# Patient Record
Sex: FEMALE | Race: WHITE | NOT HISPANIC OR LATINO | Employment: FULL TIME | URBAN - NONMETROPOLITAN AREA
[De-identification: names, ages, dates, MRNs, and addresses within clinical notes are randomized per-mention and may not be internally consistent; named-entity substitution may affect disease eponyms.]

---

## 2017-12-15 ENCOUNTER — COMMUNICATION - GICH (OUTPATIENT)
Dept: FAMILY MEDICINE | Facility: OTHER | Age: 22
End: 2017-12-15

## 2017-12-15 DIAGNOSIS — Z30.41 ENCOUNTER FOR SURVEILLANCE OF CONTRACEPTIVE PILLS: ICD-10-CM

## 2018-01-02 ENCOUNTER — HISTORY (OUTPATIENT)
Dept: FAMILY MEDICINE | Facility: OTHER | Age: 23
End: 2018-01-02

## 2018-01-02 ENCOUNTER — OFFICE VISIT - GICH (OUTPATIENT)
Dept: FAMILY MEDICINE | Facility: OTHER | Age: 23
End: 2018-01-02

## 2018-01-02 DIAGNOSIS — H10.9 CONJUNCTIVITIS: ICD-10-CM

## 2018-02-02 ENCOUNTER — DOCUMENTATION ONLY (OUTPATIENT)
Dept: FAMILY MEDICINE | Facility: OTHER | Age: 23
End: 2018-02-02

## 2018-02-02 RX ORDER — DESOGESTREL AND ETHINYL ESTRADIOL 0.15-0.03
1 KIT ORAL DAILY
COMMUNITY
Start: 2017-12-15 | End: 2018-03-12

## 2018-02-09 VITALS
HEART RATE: 94 BPM | DIASTOLIC BLOOD PRESSURE: 80 MMHG | HEIGHT: 66 IN | TEMPERATURE: 97.6 F | WEIGHT: 209.4 LBS | BODY MASS INDEX: 33.65 KG/M2 | SYSTOLIC BLOOD PRESSURE: 130 MMHG

## 2018-02-12 NOTE — NURSING NOTE
Patient Information     Patient Name MRN Kusum Moss [id number] Female 1995      Nursing Note by Callie Centeno at 2018  1:15 PM     Author:  Callie Centeno Service:  (none) Author Type:  NURS- Student Practical Nurse     Filed:  2018  1:50 PM Encounter Date:  2018 Status:  Signed     :  Callie Centeno (NURS- Student Practical Nurse)            Patient presents with bilateral redness of eyes, mattering of eyes starting yesterday. Exposed to a bacterial infection in nieces eyes. Callie Centeno LPN .............2018  1:37 PM

## 2018-02-12 NOTE — PATIENT INSTRUCTIONS
Patient Information     Patient Name MRN Kusum Moss [id number] Female 1995      Patient Instructions by Marilee Bautista NP at 2018  1:15 PM     Author:  Marilee Bautista NP Service:  (none) Author Type:  PHYS- Nurse Practitioner     Filed:  2018  1:56 PM Encounter Date:  2018 Status:  Signed     :  Marilee Bautista NP (PHYS- Nurse Practitioner)               Index Canadian Related topics   Viral or Bacterial Conjunctivitis (Pinkeye)   ________________________________________________________________________  KEY POINTS    Viral or bacterial conjunctivitis is infection, redness, and swelling of the clear membrane that lines the inside of your eyelids and covers the white of your eye.    Viral conjunctivitis will usually go away on its own without treatment. However, your healthcare provider may prescribe eye drops to help control your symptoms.    For bacterial conjunctivitis, your healthcare provider will prescribe antibiotic eye drops.    To keep from getting conjunctivitis from someone who has it, or to keep from spreading it to others, wash your hands often, and avoid close contact with people until your symptoms improve.  ________________________________________________________________________  What is conjunctivitis?   Conjunctivitis is redness and swelling of the clear membrane that lines the inside of your eyelids and covers the white of your eye. This membrane is called the conjunctiva. This redness and swelling is also called pinkeye.   What is the cause?   This type of conjunctivitis is an infection caused by viruses or bacteria. These germs can be spread easily by coughing or sneezing, or by touching something with your hands and then touching your eyes. It can also be caused by improper cleaning of soft contact lenses.  What are the symptoms?   Symptoms may include:    Red, itchy, swollen or scratchy eyes    Sensitivity to  light    Pus or watery discharge, which can cause crusty eyelashes  How is it diagnosed?   Your healthcare provider will ask about your symptoms, medical history, and activities, and examine your eyes. He or she will also check for enlarged lymph nodes near your ear and jaw, which may be a sign of an infection in your body. A sample of the pus from your eye may be sent to a lab to check for the cause.  How is it treated?   Viral conjunctivitis will usually go away on its own without treatment. However, your healthcare provider may prescribe eye drops to help control your symptoms. Anti-allergy pills or eye drops may also relieve the itching and redness. Viral conjunctivitis usually gets worse at first, then gets better in 3 to 10 days. If only one eye is affected at first, it may spread to the other eye later. Usually, if both eyes are affected, the first eye has worse conjunctivitis than the second.  For bacterial conjunctivitis, your healthcare provider will prescribe antibiotic eye drops. With antibiotics, the symptoms should improve in a couple of days. It is important to avoid close contact with people until you have used the antibiotics for 24 hours and your eye does not have a lot of pus.   If you wear contact lenses, your provider may tell you to stop wearing them until your infection is gone. Wearing contact lenses while you have conjunctivitis may make the infection last longer. Wearing contacts while you have conjunctivitis may also damage your cornea, which is the clear outer layer on the front of your eye, and cause severe and sometimes permanent vision problems. Your provider may ask you to throw away your current contact lenses and lens case.  How can I take care of myself?   Follow the full course of treatment your healthcare provider prescribes. Ask your healthcare provider:    How and when you will get your test results    How long it will take to recover    If there are activities you should avoid  and when you can return to your normal activities    How to take care of yourself at home    What symptoms or problems you should watch for and what to do if you have them  Make sure you know when you should come back for a checkup. Keep all appointments for provider visits or tests.  How can I help prevent conjunctivitis?   To keep from getting conjunctivitis from someone who has it, or to keep from spreading it to others, follow these guidelines:    Wash your hands often. Do not touch or rub your eyes.    Do not share eye makeup or cosmetics with anyone. When you have conjunctivitis, throw out all eye makeup you have been using.    Never use eye medicine that has been prescribed for someone else.    Do not share towels, washcloths, pillows, or sheets with anyone. If one of your eyes is affected but not the other, use a separate towel for each eye.    Avoid swimming while you have conjunctivitis.    Avoid close contact with people until your symptoms improve. Depending on your job, you may be asked to take time off from work until the conjunctivitis is healed.  Reviewed for medical accuracy by faculty at the [facility]. Web site: http://[email or web address]/  Developed by [employer].  Adult Advisor 2017.2 published by [employer].  Last modified: 2016-11-01  Last reviewed: 2016-10-31  This content is reviewed periodically and is subject to change as new health information becomes available. The information is intended to inform and educate and is not a replacement for medical evaluation, advice, diagnosis or treatment by a healthcare professional.  References   Adult Advisor 2017.2 Index    Copyright   2017 [employer], a division of [employer] All rights reserved.

## 2018-02-12 NOTE — PROGRESS NOTES
"Patient Information     Patient Name MRN Sex Kusum Rios [id number] Female 1995      Progress Notes by Marilee Bautista NP at 2018  1:15 PM     Author:  Marilee Bautista NP Service:  (none) Author Type:  PHYS- Nurse Practitioner     Filed:  1/3/2018  8:12 PM Encounter Date:  2018 Status:  Signed     :  aMrilee Bautista NP (PHYS- Nurse Practitioner)            HPI:  Nursing Notes:   Callie Centeno  2018  1:50 PM  Signed  Patient presents with bilateral redness of eyes, mattering of eyes starting yesterday. Exposed to a bacterial infection in nieces eyes. Callie Centeno LPN .............2018  1:37 PM      Kusum Du is a 22 y.o. female who presents to clinic today for eyes are red, with copious amounts of boogers coming out of them. Recently exposed to bacterial pink eye by niece. Denies pain or itching. Keeping eyes clean with warm wash cloth.     Past Medical History:     Diagnosis  Date     Hypertrophy of tonsil and adenoid      Past Surgical History:      Procedure  Laterality Date     MYRINGOTOMY W TUBE PLACEMENT      Recurrent ear infections       Social History     Substance Use Topics       Smoking status: Never Smoker     Smokeless tobacco: Never Used     Alcohol use No     Current Outpatient Prescriptions       Medication  Sig Dispense Refill     ENSKYCE tablet TAKE 1 TABLET BY MOUTH EVERY DAY 84 tablet 0     No current facility-administered medications for this visit.      Medications have been reviewed by me and are current to the best of my knowledge and ability.    No Known Allergies    ROS:  Refer to HPI    /80 (Cuff Site: Left Arm, Position: Sitting, Cuff Size: Adult Regular)  Pulse 94  Temp 97.6  F (36.4  C) (Tympanic)   Ht 1.68 m (5' 6.14\")  Wt 95 kg (209 lb 6.4 oz)  Breastfeeding? No  BMI 33.65 kg/m2    EXAM:  General Appearance: Well appearing female appropriate appearance for age. No acute " distress  Eyes: conjunctivae erythematous, small amount of white discharge, PERRLA  Psychological: normal affect, alert and pleasant    ASSESSMENT/PLAN:    ICD-10-CM    1. Bacterial conjunctivitis of both eyes H10.9 trimethoprim-polymyxin b (POLYTRIM) ophthalmic solution   Eyes are red with lots of white discharge, recent exposure to bacterial conjunctivitis  On exam: eyes are red with small amount of white discharge, PERRLA  Diagnosis: Bacterial Conjunctivitis  Treat with Polytrim 1 drop each eye qid 7 days  Good hand washing  Follow up if symptoms persist or worsen    Patient Instructions      Index Slovak Related topics   Viral or Bacterial Conjunctivitis (Pinkeye)   ________________________________________________________________________  KEY POINTS    Viral or bacterial conjunctivitis is infection, redness, and swelling of the clear membrane that lines the inside of your eyelids and covers the white of your eye.    Viral conjunctivitis will usually go away on its own without treatment. However, your healthcare provider may prescribe eye drops to help control your symptoms.    For bacterial conjunctivitis, your healthcare provider will prescribe antibiotic eye drops.    To keep from getting conjunctivitis from someone who has it, or to keep from spreading it to others, wash your hands often, and avoid close contact with people until your symptoms improve.  ________________________________________________________________________  What is conjunctivitis?   Conjunctivitis is redness and swelling of the clear membrane that lines the inside of your eyelids and covers the white of your eye. This membrane is called the conjunctiva. This redness and swelling is also called pinkeye.   What is the cause?   This type of conjunctivitis is an infection caused by viruses or bacteria. These germs can be spread easily by coughing or sneezing, or by touching something with your hands and then touching your eyes. It can also be  caused by improper cleaning of soft contact lenses.  What are the symptoms?   Symptoms may include:    Red, itchy, swollen or scratchy eyes    Sensitivity to light    Pus or watery discharge, which can cause crusty eyelashes  How is it diagnosed?   Your healthcare provider will ask about your symptoms, medical history, and activities, and examine your eyes. He or she will also check for enlarged lymph nodes near your ear and jaw, which may be a sign of an infection in your body. A sample of the pus from your eye may be sent to a lab to check for the cause.  How is it treated?   Viral conjunctivitis will usually go away on its own without treatment. However, your healthcare provider may prescribe eye drops to help control your symptoms. Anti-allergy pills or eye drops may also relieve the itching and redness. Viral conjunctivitis usually gets worse at first, then gets better in 3 to 10 days. If only one eye is affected at first, it may spread to the other eye later. Usually, if both eyes are affected, the first eye has worse conjunctivitis than the second.  For bacterial conjunctivitis, your healthcare provider will prescribe antibiotic eye drops. With antibiotics, the symptoms should improve in a couple of days. It is important to avoid close contact with people until you have used the antibiotics for 24 hours and your eye does not have a lot of pus.   If you wear contact lenses, your provider may tell you to stop wearing them until your infection is gone. Wearing contact lenses while you have conjunctivitis may make the infection last longer. Wearing contacts while you have conjunctivitis may also damage your cornea, which is the clear outer layer on the front of your eye, and cause severe and sometimes permanent vision problems. Your provider may ask you to throw away your current contact lenses and lens case.  How can I take care of myself?   Follow the full course of treatment your healthcare provider  prescribes. Ask your healthcare provider:    How and when you will get your test results    How long it will take to recover    If there are activities you should avoid and when you can return to your normal activities    How to take care of yourself at home    What symptoms or problems you should watch for and what to do if you have them  Make sure you know when you should come back for a checkup. Keep all appointments for provider visits or tests.  How can I help prevent conjunctivitis?   To keep from getting conjunctivitis from someone who has it, or to keep from spreading it to others, follow these guidelines:    Wash your hands often. Do not touch or rub your eyes.    Do not share eye makeup or cosmetics with anyone. When you have conjunctivitis, throw out all eye makeup you have been using.    Never use eye medicine that has been prescribed for someone else.    Do not share towels, washcloths, pillows, or sheets with anyone. If one of your eyes is affected but not the other, use a separate towel for each eye.    Avoid swimming while you have conjunctivitis.    Avoid close contact with people until your symptoms improve. Depending on your job, you may be asked to take time off from work until the conjunctivitis is healed.  Reviewed for medical accuracy by faculty at the [facility]. Web site: http://[email or web address]/scott/  Developed by [employer].  Adult Advisor 2017.2 published by [employer].  Last modified: 2016-11-01  Last reviewed: 2016-10-31  This content is reviewed periodically and is subject to change as new health information becomes available. The information is intended to inform and educate and is not a replacement for medical evaluation, advice, diagnosis or treatment by a healthcare professional.  References   Adult Advisor 2017.2 Index    Copyright   2017 [employer], a division of [employer] All rights reserved.

## 2018-02-12 NOTE — TELEPHONE ENCOUNTER
Patient Information     Patient Name MRN Sex Kusum Rios [id number] Female 1995      Telephone Encounter by Letitia Mckeon RN at 12/15/2017  3:14 PM     Author:  Letitia Mckeon RN Service:  (none) Author Type:  NURS- Registered Nurse     Filed:  12/15/2017  3:16 PM Encounter Date:  12/15/2017 Status:  Signed     :  Letitia Mckeon RN (NURS- Registered Nurse)            Hormones  Office visit in the past 12 months or per provider note.  Last visit with MCKAYLA HODGE was on: 12/15/2016 in [facility]  Next visit with MCKAYLA HODGE is on: No future appointment listed with this provider  Next visit with Family Practice is on: No future appointment listed in this department  Max refill for 12 months from last office visit or per provider note.  Due for exam.  Limited refill per protocol and letter mailed.  Letitia Mckeon RN ........   12/15/2017    3:15 PM

## 2018-03-12 DIAGNOSIS — Z30.41 ENCOUNTER FOR SURVEILLANCE OF CONTRACEPTIVE PILLS: Primary | ICD-10-CM

## 2018-03-14 RX ORDER — DESOGESTREL AND ETHINYL ESTRADIOL 0.15-0.03
KIT ORAL
Qty: 84 TABLET | Refills: 0 | Status: SHIPPED | OUTPATIENT
Start: 2018-03-14 | End: 2018-06-23

## 2018-03-14 NOTE — TELEPHONE ENCOUNTER
ENSKYCE tablet  Last Office Visit: 12/15/2016  Future Office visit:       Routing refill request to provider for review/approval because: Patient needs to be seen because it has been more than 1 year since last office visit. Reminder letter  sent with last refill. Message left on voice mail today recommending to call back and schedule an OV.     Unable to complete prescription refill per RNMedication Refill Policy.................... Letitia Mckeon ....................  3/14/2018   11:11 AM

## 2018-03-19 ENCOUNTER — HEALTH MAINTENANCE LETTER (OUTPATIENT)
Age: 23
End: 2018-03-19

## 2018-06-23 DIAGNOSIS — Z30.41 ENCOUNTER FOR SURVEILLANCE OF CONTRACEPTIVE PILLS: ICD-10-CM

## 2018-06-26 RX ORDER — DESOGESTREL AND ETHINYL ESTRADIOL 0.15-0.03
KIT ORAL
Qty: 84 TABLET | Refills: 3 | Status: SHIPPED | OUTPATIENT
Start: 2018-06-26

## 2018-06-26 NOTE — TELEPHONE ENCOUNTER
ENSKYCE 0.15-30 MG-MCG per tablet  LOV-12/15/2016    Future Office visit:       Routing refill request to provider for review/approval because:  Patient remains over due for OV after multiply notifications Reminder letter and messages left on voice mail.     Unable to complete prescription refill per RNMedication Refill Policy.................... Letitia Mckeon ....................  6/26/2018   9:27 AM

## 2018-07-23 NOTE — PROGRESS NOTES
Patient Information     Patient Name  Kusum Du MRN  [id number] Sex  Female   1995      Letter by Doris Reynolds DO at      Author:  Doris Reynolds DO Service:  (none) Author Type:  (none)    Filed:   Encounter Date:  12/15/2017 Status:  (Other)           Kusum Du  [address] [facility] [address]          December 15, 2017    Dear Ms. Du:    A 90 day refill of ENSKYCE tablet has been called into your pharmacy.    Additional refills require an annual office visit with Ang Morales MD.   Please call the clinic at [phone] to schedule your appointment.    If you should require additional refills before your scheduled appointment, please contact your pharmacy and we will refill your medication until that date.      Thank you,    The Refill Nurse  [facility]

## 2023-07-08 ENCOUNTER — HOSPITAL ENCOUNTER (EMERGENCY)
Facility: OTHER | Age: 28
Discharge: HOME | End: 2023-07-09
Attending: FAMILY MEDICINE | Admitting: FAMILY MEDICINE
Payer: COMMERCIAL

## 2023-07-08 DIAGNOSIS — T50.905A MEDICATION REACTION, INITIAL ENCOUNTER: ICD-10-CM

## 2023-07-08 LAB
ALBUMIN SERPL BCG-MCNC: 4.3 G/DL (ref 3.5–5.2)
ALP SERPL-CCNC: 111 U/L (ref 35–104)
ALT SERPL W P-5'-P-CCNC: 15 U/L (ref 0–50)
ANION GAP SERPL CALCULATED.3IONS-SCNC: 13 MMOL/L (ref 7–15)
AST SERPL W P-5'-P-CCNC: 22 U/L (ref 0–45)
BASOPHILS # BLD AUTO: 0.1 10E3/UL (ref 0–0.2)
BASOPHILS NFR BLD AUTO: 0 %
BILIRUB SERPL-MCNC: <0.2 MG/DL
BUN SERPL-MCNC: 12.9 MG/DL (ref 6–20)
CALCIUM SERPL-MCNC: 9.6 MG/DL (ref 8.6–10)
CHLORIDE SERPL-SCNC: 101 MMOL/L (ref 98–107)
CREAT SERPL-MCNC: 0.77 MG/DL (ref 0.51–0.95)
DEPRECATED HCO3 PLAS-SCNC: 23 MMOL/L (ref 22–29)
EOSINOPHIL # BLD AUTO: 0.1 10E3/UL (ref 0–0.7)
EOSINOPHIL NFR BLD AUTO: 0 %
ERYTHROCYTE [DISTWIDTH] IN BLOOD BY AUTOMATED COUNT: 12.6 % (ref 10–15)
ETHANOL SERPL-MCNC: <0.01 G/DL
GFR SERPL CREATININE-BSD FRML MDRD: >90 ML/MIN/1.73M2
GLUCOSE BLDC GLUCOMTR-MCNC: 143 MG/DL (ref 70–99)
GLUCOSE SERPL-MCNC: 154 MG/DL (ref 70–99)
HCT VFR BLD AUTO: 38.5 % (ref 35–47)
HGB BLD-MCNC: 13.3 G/DL (ref 11.7–15.7)
IMM GRANULOCYTES # BLD: 0.1 10E3/UL
IMM GRANULOCYTES NFR BLD: 0 %
LYMPHOCYTES # BLD AUTO: 2.5 10E3/UL (ref 0.8–5.3)
LYMPHOCYTES NFR BLD AUTO: 19 %
MCH RBC QN AUTO: 29.2 PG (ref 26.5–33)
MCHC RBC AUTO-ENTMCNC: 34.5 G/DL (ref 31.5–36.5)
MCV RBC AUTO: 84 FL (ref 78–100)
MONOCYTES # BLD AUTO: 0.9 10E3/UL (ref 0–1.3)
MONOCYTES NFR BLD AUTO: 7 %
NEUTROPHILS # BLD AUTO: 9.8 10E3/UL (ref 1.6–8.3)
NEUTROPHILS NFR BLD AUTO: 74 %
NRBC # BLD AUTO: 0 10E3/UL
NRBC BLD AUTO-RTO: 0 /100
PLATELET # BLD AUTO: 288 10E3/UL (ref 150–450)
POTASSIUM SERPL-SCNC: 4.2 MMOL/L (ref 3.4–5.3)
PROT SERPL-MCNC: 7.2 G/DL (ref 6.4–8.3)
RBC # BLD AUTO: 4.56 10E6/UL (ref 3.8–5.2)
SODIUM SERPL-SCNC: 137 MMOL/L (ref 136–145)
WBC # BLD AUTO: 13.4 10E3/UL (ref 4–11)

## 2023-07-08 PROCEDURE — 82077 ASSAY SPEC XCP UR&BREATH IA: CPT | Performed by: FAMILY MEDICINE

## 2023-07-08 PROCEDURE — 85025 COMPLETE CBC W/AUTO DIFF WBC: CPT | Performed by: FAMILY MEDICINE

## 2023-07-08 PROCEDURE — 82962 GLUCOSE BLOOD TEST: CPT

## 2023-07-08 PROCEDURE — 96374 THER/PROPH/DIAG INJ IV PUSH: CPT | Performed by: FAMILY MEDICINE

## 2023-07-08 PROCEDURE — 250N000011 HC RX IP 250 OP 636: Performed by: FAMILY MEDICINE

## 2023-07-08 PROCEDURE — 96361 HYDRATE IV INFUSION ADD-ON: CPT | Performed by: FAMILY MEDICINE

## 2023-07-08 PROCEDURE — 99283 EMERGENCY DEPT VISIT LOW MDM: CPT | Performed by: FAMILY MEDICINE

## 2023-07-08 PROCEDURE — 80053 COMPREHEN METABOLIC PANEL: CPT | Performed by: FAMILY MEDICINE

## 2023-07-08 PROCEDURE — 36415 COLL VENOUS BLD VENIPUNCTURE: CPT | Performed by: FAMILY MEDICINE

## 2023-07-08 PROCEDURE — 258N000003 HC RX IP 258 OP 636: Performed by: FAMILY MEDICINE

## 2023-07-08 PROCEDURE — 93005 ELECTROCARDIOGRAM TRACING: CPT | Performed by: FAMILY MEDICINE

## 2023-07-08 PROCEDURE — 99284 EMERGENCY DEPT VISIT MOD MDM: CPT | Mod: 25 | Performed by: FAMILY MEDICINE

## 2023-07-08 PROCEDURE — 93010 ELECTROCARDIOGRAM REPORT: CPT | Performed by: INTERNAL MEDICINE

## 2023-07-08 RX ORDER — LORAZEPAM 2 MG/ML
0.5 INJECTION INTRAMUSCULAR ONCE
Status: COMPLETED | OUTPATIENT
Start: 2023-07-08 | End: 2023-07-08

## 2023-07-08 RX ADMIN — SODIUM CHLORIDE 1000 ML: 9 INJECTION, SOLUTION INTRAVENOUS at 23:03

## 2023-07-08 RX ADMIN — LORAZEPAM 0.5 MG: 2 INJECTION INTRAMUSCULAR; INTRAVENOUS at 23:03

## 2023-07-08 ASSESSMENT — ACTIVITIES OF DAILY LIVING (ADL): ADLS_ACUITY_SCORE: 35

## 2023-07-09 VITALS
WEIGHT: 209 LBS | HEART RATE: 81 BPM | OXYGEN SATURATION: 99 % | RESPIRATION RATE: 28 BRPM | DIASTOLIC BLOOD PRESSURE: 79 MMHG | SYSTOLIC BLOOD PRESSURE: 141 MMHG | TEMPERATURE: 98 F | BODY MASS INDEX: 33.59 KG/M2

## 2023-07-09 NOTE — ED TRIAGE NOTES
ED Nursing Triage Note (General)   ________________________________    Kusum FARMER Florian is a 28 year old Female that presents to triage private car  With history of  Thc intoxication and panic attack, she took an edible from a family member up to 100 mg of thc  reported by patient   Significant symptoms had onset 2 hour(s) ago.  BP (!) 151/104   Pulse 112   Temp 98  F (36.7  C) (Tympanic)   Resp 28   Wt 94.8 kg (209 lb)   SpO2 100%   BMI 33.59 kg/m        PRE HOSPITAL PRIOR LIVING SITUATION Alone     Triage Assessment     Row Name 07/08/23 [id number]       Triage Assessment (Adult)    Airway WDL WDL       Respiratory WDL    Respiratory WDL WDL       Skin Circulation/Temperature WDL    Skin Circulation/Temperature WDL WDL       Cardiac WDL    Cardiac WDL WDL       Peripheral/Neurovascular WDL    Peripheral Neurovascular WDL WDL       Cognitive/Neuro/Behavioral WDL    Cognitive/Neuro/Behavioral WDL X;mood/behavior    Level of Consciousness alert    Mood/Behavior agitated;anxious       Laura Coma Scale    Best Eye Response 4-->(E4) spontaneous    Best Motor Response 6-->(M6) obeys commands    Best Verbal Response 5-->(V5) oriented    [place] Coma Scale Score 15

## 2023-07-09 NOTE — ED PROVIDER NOTES
History     Chief Complaint   Patient presents with     Panic Attack     HPI  Kusum Du is a 28 year old female who presents to the emergency department feeling anxious.  No recent fevers or chills, patient took THC and she is not used to that.  No other ethanol or drug use.  Patient denies chest pain or shortness of breath.  Patient does not endorse recent illness or trauma.    Review nurses notes below, similar history is related to me.  Kusum Du is a 28 year old Female that presents to triage private car  With history of  Thc intoxication and panic attack, she took an edible from a family member up to 100 mg of thc  reported by patient   Significant symptoms had onset 2 hour(s) ago.  Allergies:  No Known Allergies    Problem List:    Patient Active Problem List    Diagnosis Date Noted     Contraception management 07/28/2015     Priority: Medium        Past Medical History:    Past Medical History:   Diagnosis Date     Hypertrophy of tonsils with hypertrophy of adenoids        Past Surgical History:    Past Surgical History:   Procedure Laterality Date     MYRINGOTOMY, INSERT TUBE, COMBINED      Recurrent ear infections       Family History:    Family History   Problem Relation Age of Onset     Genetic Disorder Other         Genetic,Unremarkable       Social History:  Marital Status:  Single [1]  Social History     Tobacco Use     Smoking status: Never     Smokeless tobacco: Never   Substance Use Topics     Alcohol use: No     Alcohol/week: 0.0 standard drinks of alcohol     Drug use: Unknown     Types: Other     Comment: Drug use: No        Medications:    ISIBLOOM 0.15-30 MG-MCG per tablet          Review of Systems   Constitutional: Negative for activity change, appetite change, chills, fatigue and fever.   HENT: Negative for congestion, rhinorrhea and sore throat.    Eyes: Negative for redness.   Respiratory: Negative for cough and shortness of breath.    Cardiovascular: Negative for chest  pain.   Gastrointestinal: Negative for abdominal pain, diarrhea, nausea and vomiting.   Genitourinary: Negative for dysuria and hematuria.   Musculoskeletal: Negative for arthralgias, myalgias and neck stiffness.   Skin: Negative for rash.   Neurological: Negative for dizziness and headaches.       Physical Exam   BP: (!) 151/104  Pulse: 112  Temp: 98  F (36.7  C)  Resp: 28  Weight: 94.8 kg (209 lb)  SpO2: 100 %      Physical Exam  Vitals and nursing note reviewed.   Constitutional:       General: She is in acute distress.      Appearance: Normal appearance. She is not diaphoretic.   HENT:      Head: Atraumatic.      Mouth/Throat:      Mouth: Mucous membranes are moist.   Eyes:      General: No scleral icterus.     Conjunctiva/sclera: Conjunctivae normal.   Cardiovascular:      Rate and Rhythm: Normal rate.      Heart sounds: Normal heart sounds.   Pulmonary:      Effort: No respiratory distress.      Breath sounds: Normal breath sounds.   Abdominal:      General: Abdomen is flat.   Musculoskeletal:      Cervical back: Neck supple.   Skin:     General: Skin is warm.      Findings: No rash.   Neurological:      Mental Status: She is alert.       Initially in distress, anxious appearing.  Improved significantly over the course of her ED stay.    ED Course          EKG: Normal sinus rhythm, normal ECG, rate 87, QTc 466 ms, QRS duration 82 ms.    Results for orders placed or performed during the hospital encounter of 07/08/23 (from the past 24 hour(s))   CBC with platelets differential    Narrative    The following orders were created for panel order CBC with platelets differential.  Procedure                               Abnormality         Status                     ---------                               -----------         ------                     CBC with platelets and d...[[id number]]  Abnormal            Final result                 Please view results for these tests on the individual orders.   Comprehensive  metabolic panel   Result Value Ref Range    Sodium 137 136 - 145 mmol/L    Potassium 4.2 3.4 - 5.3 mmol/L    Chloride 101 98 - 107 mmol/L    Carbon Dioxide (CO2) 23 22 - 29 mmol/L    Anion Gap 13 7 - 15 mmol/L    Urea Nitrogen 12.9 6.0 - 20.0 mg/dL    Creatinine 0.77 0.51 - 0.95 mg/dL    Calcium 9.6 8.6 - 10.0 mg/dL    Glucose 154 (H) 70 - 99 mg/dL    Alkaline Phosphatase 111 (H) 35 - 104 U/L    AST 22 0 - 45 U/L    ALT 15 0 - 50 U/L    Protein Total 7.2 6.4 - 8.3 g/dL    Albumin 4.3 3.5 - 5.2 g/dL    Bilirubin Total <0.2 <=1.2 mg/dL    GFR Estimate >90 >60 mL/min/1.73m2   Ethanol GH   Result Value Ref Range    Alcohol ethyl <0.01 <=0.01 g/dL   CBC with platelets and differential   Result Value Ref Range    WBC Count 13.4 (H) 4.0 - 11.0 10e3/uL    RBC Count 4.56 3.80 - 5.20 10e6/uL    Hemoglobin 13.3 11.7 - 15.7 g/dL    Hematocrit 38.5 35.0 - 47.0 %    MCV 84 78 - 100 fL    MCH 29.2 26.5 - 33.0 pg    MCHC 34.5 31.5 - 36.5 g/dL    RDW 12.6 10.0 - 15.0 %    Platelet Count 288 150 - 450 10e3/uL    % Neutrophils 74 %    % Lymphocytes 19 %    % Monocytes 7 %    % Eosinophils 0 %    % Basophils 0 %    % Immature Granulocytes 0 %    NRBCs per 100 WBC 0 <1 /100    Absolute Neutrophils 9.8 (H) 1.6 - 8.3 10e3/uL    Absolute Lymphocytes 2.5 0.8 - 5.3 10e3/uL    Absolute Monocytes 0.9 0.0 - 1.3 10e3/uL    Absolute Eosinophils 0.1 0.0 - 0.7 10e3/uL    Absolute Basophils 0.1 0.0 - 0.2 10e3/uL    Absolute Immature Granulocytes 0.1 <=0.4 10e3/uL    Absolute NRBCs 0.0 10e3/uL   Glucose by meter   Result Value Ref Range    GLUCOSE BY METER POCT 143 (H) 70 - 99 mg/dL       Medications   0.9% sodium chloride BOLUS (1,000 mLs Intravenous $New Bag 7/8/23 2303)   LORazepam (ATIVAN) injection 0.5 mg (0.5 mg Intravenous $Given 7/8/23 2303)       Assessments & Plan (with Medical Decision Making)     I have reviewed the nursing notes.    I have reviewed the findings, diagnosis, plan and need for follow up with the patient.  Serial assessments  are reassuring over the course of her ED stay.  Significant improvement in symptoms with the interventions above.  Medical Decision Making  The patient's presentation was of moderate complexity (an acute illness with systemic symptoms).    The patient's evaluation involved:  review of 2 test result(s) ordered prior to this encounter (see separate area of note for details)    The patient's management necessitated moderate risk (prescription drug management including medications given in the ED).    Differential diagnosis includes but not limited to substance abuse, primary anxiety disorder, metabolic delirium.  Reassuring work-up in the ED, reassuring clinical trajectory here in the ED.  After shared decision-making conversation, patient feels like she can go home.  It is agreed upon that no further urgent diagnostics or interventions are clearly indicated at this time.  She should return to the emergency department with recurrent or worsening symptoms.  Patient verbalized understanding of plan is agreement she left the ED improving condition.    New Prescriptions    No medications on file       Final diagnoses:   Medication reaction, initial encounter       7/8/2023   [facility]     Torrey Galloway MD  07/12/23 [id number]

## 2023-07-10 LAB
ATRIAL RATE - MUSE: 87 BPM
DIASTOLIC BLOOD PRESSURE - MUSE: NORMAL MMHG
INTERPRETATION ECG - MUSE: NORMAL
P AXIS - MUSE: 64 DEGREES
PR INTERVAL - MUSE: 148 MS
QRS DURATION - MUSE: 82 MS
QT - MUSE: 388 MS
QTC - MUSE: 466 MS
R AXIS - MUSE: 43 DEGREES
SYSTOLIC BLOOD PRESSURE - MUSE: NORMAL MMHG
T AXIS - MUSE: 24 DEGREES
VENTRICULAR RATE- MUSE: 87 BPM

## 2023-07-12 ASSESSMENT — ENCOUNTER SYMPTOMS
HEMATURIA: 0
DIARRHEA: 0
APPETITE CHANGE: 0
FATIGUE: 0
FEVER: 0
RHINORRHEA: 0
NAUSEA: 0
DYSURIA: 0
DIZZINESS: 0
VOMITING: 0
ACTIVITY CHANGE: 0
NECK STIFFNESS: 0
SORE THROAT: 0
MYALGIAS: 0
SHORTNESS OF BREATH: 0
COUGH: 0
HEADACHES: 0
CHILLS: 0
ARTHRALGIAS: 0
EYE REDNESS: 0
ABDOMINAL PAIN: 0

## 2023-07-29 ENCOUNTER — HEALTH MAINTENANCE LETTER (OUTPATIENT)
Age: 28
End: 2023-07-29

## 2024-09-21 ENCOUNTER — HEALTH MAINTENANCE LETTER (OUTPATIENT)
Age: 29
End: 2024-09-21

## (undated) RX ORDER — LORAZEPAM 2 MG/ML
INJECTION INTRAMUSCULAR
Status: DISPENSED
Start: 2023-07-08